# Patient Record
Sex: MALE | Race: BLACK OR AFRICAN AMERICAN | NOT HISPANIC OR LATINO | Employment: FULL TIME | URBAN - METROPOLITAN AREA
[De-identification: names, ages, dates, MRNs, and addresses within clinical notes are randomized per-mention and may not be internally consistent; named-entity substitution may affect disease eponyms.]

---

## 2019-04-03 ENCOUNTER — APPOINTMENT (OUTPATIENT)
Dept: GENERAL RADIOLOGY | Facility: HOSPITAL | Age: 54
End: 2019-04-03

## 2019-04-03 ENCOUNTER — HOSPITAL ENCOUNTER (EMERGENCY)
Facility: HOSPITAL | Age: 54
Discharge: HOME | End: 2019-04-04
Attending: EMERGENCY MEDICINE | Admitting: EMERGENCY MEDICINE

## 2019-04-03 DIAGNOSIS — R07.9 NONSPECIFIC CHEST PAIN: ICD-10-CM

## 2019-04-03 DIAGNOSIS — F14.10 COCAINE ABUSE (HCC): ICD-10-CM

## 2019-04-03 DIAGNOSIS — F10.920 ALCOHOLIC INTOXICATION WITHOUT COMPLICATION (HCC): Primary | ICD-10-CM

## 2019-04-03 LAB
ALBUMIN SERPL-MCNC: 4.51 G/DL (ref 3.2–4.8)
ALBUMIN/GLOB SERPL: 1.5 G/DL (ref 1.5–2.5)
ALP SERPL-CCNC: 81 U/L (ref 25–100)
ALT SERPL W P-5'-P-CCNC: 46 U/L (ref 7–40)
ANION GAP SERPL CALCULATED.3IONS-SCNC: 14 MMOL/L (ref 3–11)
AST SERPL-CCNC: 38 U/L (ref 0–33)
BASOPHILS # BLD AUTO: 0.03 10*3/MM3 (ref 0–0.2)
BASOPHILS NFR BLD AUTO: 0.4 % (ref 0–1)
BILIRUB SERPL-MCNC: 0.2 MG/DL (ref 0.3–1.2)
BNP SERPL-MCNC: 6 PG/ML (ref 0–100)
BUN BLD-MCNC: 14 MG/DL (ref 9–23)
BUN/CREAT SERPL: 15.6 (ref 7–25)
CALCIUM SPEC-SCNC: 8.9 MG/DL (ref 8.7–10.4)
CHLORIDE SERPL-SCNC: 109 MMOL/L (ref 99–109)
CO2 SERPL-SCNC: 22 MMOL/L (ref 20–31)
CREAT BLD-MCNC: 0.9 MG/DL (ref 0.6–1.3)
DEPRECATED RDW RBC AUTO: 48 FL (ref 37–54)
EOSINOPHIL # BLD AUTO: 0.07 10*3/MM3 (ref 0–0.3)
EOSINOPHIL NFR BLD AUTO: 0.8 % (ref 0–3)
ERYTHROCYTE [DISTWIDTH] IN BLOOD BY AUTOMATED COUNT: 13.5 % (ref 11.3–14.5)
ETHANOL BLD-MCNC: 356 MG/DL (ref 0–10)
GFR SERPL CREATININE-BSD FRML MDRD: 106 ML/MIN/1.73
GFR SERPL CREATININE-BSD FRML MDRD: 88 ML/MIN/1.73
GLOBULIN UR ELPH-MCNC: 3.1 GM/DL
GLUCOSE BLD-MCNC: 95 MG/DL (ref 70–100)
HCT VFR BLD AUTO: 42.2 % (ref 38.9–50.9)
HGB BLD-MCNC: 14.6 G/DL (ref 13.1–17.5)
HOLD SPECIMEN: NORMAL
HOLD SPECIMEN: NORMAL
IMM GRANULOCYTES # BLD AUTO: 0.06 10*3/MM3 (ref 0–0.05)
IMM GRANULOCYTES NFR BLD AUTO: 0.7 % (ref 0–0.6)
LIPASE SERPL-CCNC: 121 U/L (ref 6–51)
LYMPHOCYTES # BLD AUTO: 2.3 10*3/MM3 (ref 0.6–4.8)
LYMPHOCYTES NFR BLD AUTO: 27.8 % (ref 24–44)
MCH RBC QN AUTO: 33.2 PG (ref 27–31)
MCHC RBC AUTO-ENTMCNC: 34.6 G/DL (ref 32–36)
MCV RBC AUTO: 95.9 FL (ref 80–99)
MONOCYTES # BLD AUTO: 0.79 10*3/MM3 (ref 0–1)
MONOCYTES NFR BLD AUTO: 9.6 % (ref 0–12)
NEUTROPHILS # BLD AUTO: 5.01 10*3/MM3 (ref 1.5–8.3)
NEUTROPHILS NFR BLD AUTO: 60.7 % (ref 41–71)
PLATELET # BLD AUTO: 296 10*3/MM3 (ref 150–450)
PMV BLD AUTO: 9.6 FL (ref 6–12)
POTASSIUM BLD-SCNC: 3.7 MMOL/L (ref 3.5–5.5)
PROT SERPL-MCNC: 7.6 G/DL (ref 5.7–8.2)
RBC # BLD AUTO: 4.4 10*6/MM3 (ref 4.2–5.76)
SODIUM BLD-SCNC: 145 MMOL/L (ref 132–146)
TROPONIN I SERPL-MCNC: 0.01 NG/ML (ref 0–0.07)
WBC NRBC COR # BLD: 8.26 10*3/MM3 (ref 3.5–10.8)
WHOLE BLOOD HOLD SPECIMEN: NORMAL
WHOLE BLOOD HOLD SPECIMEN: NORMAL

## 2019-04-03 PROCEDURE — 93005 ELECTROCARDIOGRAM TRACING: CPT

## 2019-04-03 PROCEDURE — 80307 DRUG TEST PRSMV CHEM ANLYZR: CPT | Performed by: EMERGENCY MEDICINE

## 2019-04-03 PROCEDURE — 80053 COMPREHEN METABOLIC PANEL: CPT | Performed by: EMERGENCY MEDICINE

## 2019-04-03 PROCEDURE — 83880 ASSAY OF NATRIURETIC PEPTIDE: CPT

## 2019-04-03 PROCEDURE — 83690 ASSAY OF LIPASE: CPT | Performed by: EMERGENCY MEDICINE

## 2019-04-03 PROCEDURE — 85025 COMPLETE CBC W/AUTO DIFF WBC: CPT

## 2019-04-03 PROCEDURE — 71045 X-RAY EXAM CHEST 1 VIEW: CPT

## 2019-04-03 PROCEDURE — 99285 EMERGENCY DEPT VISIT HI MDM: CPT

## 2019-04-03 PROCEDURE — 93005 ELECTROCARDIOGRAM TRACING: CPT | Performed by: EMERGENCY MEDICINE

## 2019-04-03 PROCEDURE — 84484 ASSAY OF TROPONIN QUANT: CPT

## 2019-04-03 RX ORDER — LISINOPRIL 20 MG/1
20 TABLET ORAL DAILY
COMMUNITY
End: 2019-04-04

## 2019-04-03 RX ORDER — SODIUM CHLORIDE 0.9 % (FLUSH) 0.9 %
10 SYRINGE (ML) INJECTION AS NEEDED
Status: DISCONTINUED | OUTPATIENT
Start: 2019-04-03 | End: 2019-04-04 | Stop reason: HOSPADM

## 2019-04-03 RX ORDER — ASPIRIN 81 MG/1
324 TABLET, CHEWABLE ORAL ONCE
Status: DISCONTINUED | OUTPATIENT
Start: 2019-04-03 | End: 2019-04-04 | Stop reason: HOSPADM

## 2019-04-04 VITALS
BODY MASS INDEX: 24.34 KG/M2 | HEIGHT: 70 IN | WEIGHT: 170 LBS | SYSTOLIC BLOOD PRESSURE: 129 MMHG | HEART RATE: 75 BPM | DIASTOLIC BLOOD PRESSURE: 72 MMHG | RESPIRATION RATE: 18 BRPM | OXYGEN SATURATION: 97 % | TEMPERATURE: 98.4 F

## 2019-04-04 NOTE — ED PROVIDER NOTES
"Mojgan Mercedes is a 53 y.o. male who presents to the ED with complaints of chest pain. The patient reports that for the past 4 days he has been experiencing chest pain that comes and goes for 5 minutes at a time. He states that he smoked crack today and his chest began hurting shortly after that today, but the pain has since lessened. He also reports feeling lightheaded when he is experiencing the chest pain. He denies shortness of breath, cough, rhinorrhea, and nausea. The patient has a history of hypertension and states that he has had a myocardial infarction once before. He also reports alcohol use. There are no other acute complaints at this time.         History provided by:  Patient  Chest Pain   Pain radiates to:  Does not radiate  Pain severity:  Moderate  Duration:  4 days  Timing:  Intermittent  Associated symptoms: no cough, no nausea and no shortness of breath        Review of Systems   HENT: Negative for rhinorrhea.    Respiratory: Negative for cough and shortness of breath.    Cardiovascular: Positive for chest pain.   Gastrointestinal: Negative for nausea.   Neurological: Positive for light-headedness.   All other systems reviewed and are negative.      Past Medical History:   Diagnosis Date   • Hypertension    • Myocardial infarction (CMS/HCC)        No Known Allergies    Past Surgical History:   Procedure Laterality Date   • ABDOMINAL SURGERY      Knife Wound and abd repair   • TOTAL ELBOW REPLACEMENT Right        History reviewed. No pertinent family history.    Social History     Socioeconomic History   • Marital status: Single     Spouse name: Not on file   • Number of children: Not on file   • Years of education: Not on file   • Highest education level: Not on file   Tobacco Use   • Smoking status: Current Every Day Smoker     Packs/day: 0.50     Years: 40.00     Pack years: 20.00   Substance and Sexual Activity   • Alcohol use: Yes     Comment: \"as much as I can\"   • Drug use: Yes "     Comment: Crack   • Sexual activity: Defer         Objective   Physical Exam   Constitutional: He is oriented to person, place, and time. He appears well-developed and well-nourished. No distress.   HENT:   Head: Normocephalic and atraumatic.   Pharynx benign. Airway patent.    Eyes: Conjunctivae are normal. No scleral icterus.   Neck: Normal range of motion. Neck supple.   Cardiovascular: Normal rate, regular rhythm and normal heart sounds.   Pulmonary/Chest: Effort normal and breath sounds normal. He exhibits tenderness.   Sternal and left upper chest tenderness.    Abdominal: Soft. There is no tenderness.   Musculoskeletal: Normal range of motion.   Neurological: He is alert and oriented to person, place, and time.   Skin: Skin is warm and dry.   Psychiatric: He has a normal mood and affect. His behavior is normal.   Nursing note and vitals reviewed.      Procedures         ED Course  ED Course as of Apr 05 0152 Wed Apr 03, 2019 2115 He is quite intoxicated and has no one to pick him up or place to go safely.  Will watch until more sober.  [LI]   Thu Apr 04, 2019   0052 Currently sleeping.  No evidence for heart attack by ECG and troponin.  Doubt IHD.  Will need to stay long enough to sober up and be discharged.  [LI]   0054 Despite history of hypertension and not taking his lisinopril for months, his BP has been low normal here.  [LI]   0548 The patient has been deemed clinically sober and is now appropriate for discharge.  [DD]      ED Course User Index  [DD] Michael Solomon MD  [LI] Armando Isidro MD       Recent Results (from the past 24 hour(s))   Comprehensive Metabolic Panel    Collection Time: 04/03/19  6:30 PM   Result Value Ref Range    Glucose 95 70 - 100 mg/dL    BUN 14 9 - 23 mg/dL    Creatinine 0.90 0.60 - 1.30 mg/dL    Sodium 145 132 - 146 mmol/L    Potassium 3.7 3.5 - 5.5 mmol/L    Chloride 109 99 - 109 mmol/L    CO2 22.0 20.0 - 31.0 mmol/L    Calcium 8.9 8.7 - 10.4 mg/dL    Total  Protein 7.6 5.7 - 8.2 g/dL    Albumin 4.51 3.20 - 4.80 g/dL    ALT (SGPT) 46 (H) 7 - 40 U/L    AST (SGOT) 38 (H) 0 - 33 U/L    Alkaline Phosphatase 81 25 - 100 U/L    Total Bilirubin 0.2 (L) 0.3 - 1.2 mg/dL    eGFR Non African Amer 88 >60 mL/min/1.73    eGFR  African Amer 106 >60 mL/min/1.73    Globulin 3.1 gm/dL    A/G Ratio 1.5 1.5 - 2.5 g/dL    BUN/Creatinine Ratio 15.6 7.0 - 25.0    Anion Gap 14.0 (H) 3.0 - 11.0 mmol/L   Lipase    Collection Time: 04/03/19  6:30 PM   Result Value Ref Range    Lipase 121 (H) 6 - 51 U/L   BNP    Collection Time: 04/03/19  6:30 PM   Result Value Ref Range    BNP 6.0 0.0 - 100.0 pg/mL   Light Blue Top    Collection Time: 04/03/19  6:30 PM   Result Value Ref Range    Extra Tube hold for add-on    Green Top (Gel)    Collection Time: 04/03/19  6:30 PM   Result Value Ref Range    Extra Tube Hold for add-ons.    Lavender Top    Collection Time: 04/03/19  6:30 PM   Result Value Ref Range    Extra Tube hold for add-on    Gold Top - SST    Collection Time: 04/03/19  6:30 PM   Result Value Ref Range    Extra Tube Hold for add-ons.    CBC Auto Differential    Collection Time: 04/03/19  6:30 PM   Result Value Ref Range    WBC 8.26 3.50 - 10.80 10*3/mm3    RBC 4.40 4.20 - 5.76 10*6/mm3    Hemoglobin 14.6 13.1 - 17.5 g/dL    Hematocrit 42.2 38.9 - 50.9 %    MCV 95.9 80.0 - 99.0 fL    MCH 33.2 (H) 27.0 - 31.0 pg    MCHC 34.6 32.0 - 36.0 g/dL    RDW 13.5 11.3 - 14.5 %    RDW-SD 48.0 37.0 - 54.0 fl    MPV 9.6 6.0 - 12.0 fL    Platelets 296 150 - 450 10*3/mm3    Neutrophil % 60.7 41.0 - 71.0 %    Lymphocyte % 27.8 24.0 - 44.0 %    Monocyte % 9.6 0.0 - 12.0 %    Eosinophil % 0.8 0.0 - 3.0 %    Basophil % 0.4 0.0 - 1.0 %    Immature Grans % 0.7 (H) 0.0 - 0.6 %    Neutrophils, Absolute 5.01 1.50 - 8.30 10*3/mm3    Lymphocytes, Absolute 2.30 0.60 - 4.80 10*3/mm3    Monocytes, Absolute 0.79 0.00 - 1.00 10*3/mm3    Eosinophils, Absolute 0.07 0.00 - 0.30 10*3/mm3    Basophils, Absolute 0.03 0.00 - 0.20  10*3/mm3    Immature Grans, Absolute 0.06 (H) 0.00 - 0.05 10*3/mm3   Ethanol    Collection Time: 04/03/19  6:30 PM   Result Value Ref Range    Ethanol 356 (C) 0 - 10 mg/dL   POC Troponin, Rapid    Collection Time: 04/03/19  6:41 PM   Result Value Ref Range    Troponin I 0.01 0.00 - 0.07 ng/mL     Note: In addition to lab results from this visit, the labs listed above may include labs taken at another facility or during a different encounter within the last 24 hours. Please correlate lab times with ED admission and discharge times for further clarification of the services performed during this visit.    XR Chest 1 View   Final Result   No acute cardiopulmonary findings.      Signer Name: Al Verdin MD    Signed: 4/3/2019 7:37 PM    Workstation Name: [id number]            Vitals:    04/03/19 1933 04/03/19 1945 04/03/19 2000 04/03/19 2302   BP:  116/80 117/88 111/76   Pulse: 81 78 75    Resp:       Temp:       SpO2: 97% 95% 97% 96%   Weight:       Height:         Medications   sodium chloride 0.9 % flush 10 mL (not administered)   aspirin chewable tablet 324 mg (324 mg Oral Not Given 4/3/19 1830)     ECG/EMG Results (last 24 hours)     Procedure Component Value Units Date/Time    ECG 12 Lead [[id number]] Collected:  04/03/19 1826     Updated:  04/03/19 1915    Narrative:       Test Reason : CP  Blood Pressure : **/** mmHG  Vent. Rate : 093 BPM     Atrial Rate : 093 BPM     P-R Int : 158 ms          QRS Dur : 096 ms      QT Int : 350 ms       P-R-T Axes : 090 062 059 degrees     QTc Int : 435 ms    ** Poor data quality, interpretation may be adversely affected  Normal sinus rhythm  Normal ECG  No previous ECGs available  Confirmed by KEL SHEEHAN MD ([id number]) on 4/3/2019 7:15:33 PM    Referred By:  IFEOMA NICOLAS           Confirmed By:KEL SHEEHAN MD        ECG 12 Lead   Final Result   Test Reason : CP   Blood Pressure : **/** mmHG   Vent. Rate : 093 BPM     Atrial Rate : 093 BPM      P-R Int : 158 ms          QRS Dur :  096 ms       QT Int : 350 ms       P-R-T Axes : 090 062 059 degrees      QTc Int : 435 ms      ** Poor data quality, interpretation may be adversely affected   Normal sinus rhythm   Normal ECG   No previous ECGs available   Confirmed by KEL ISIDRO MD ([id number]) on 4/3/2019 7:15:33 PM      Referred By:  ED MD           Confirmed By:KEL ISIDRO MD                        [facility]    Final diagnoses:   Alcoholic intoxication without complication (CMS/HCC)   Cocaine abuse (CMS/HCC)   Nonspecific chest pain       Documentation assistance provided by hadley Sutton.  Information recorded by the scribe was done at my direction and has been verified and validated by me.     Donna Sutton  04/03/19 2030       Donna Sutton  04/03/19 2120       Kel Isidro MD  04/04/19 0057       Kel Isidro MD  04/05/19 0152

## 2019-04-04 NOTE — DISCHARGE INSTRUCTIONS
You need healthy life habits, including stopping abusing alcohol and other drugs.  If you have a doctor, make an appointment to see her or him for help getting a good life style.  If you don't have a doctor, make an appointment either with a [facility] clinic or another practice listed below.    Follow up with one of the [facility] Primary Care Providers below to setup primary care. If you need assistance coordinating a primary care appointment with a [facility] Primary Care Provider, please contact the Primary Care Coordinators at [phone] for appointment scheduling.    [facility], Primary Care   [address], [address]   [place], [place] [id number] [phone]    [facility] Internal Medicine & Endocrinology  [address], [address]  [place] [address] [phone]    [facility] Family Medicine  [address], [address]   [place] [address] [phone]    [facility] Primary Care  [address], [address]  [place], [place] [id number] [phone]    [facility], Primary Care,   [address], [address]   [place] [address] [phone]    [facility] Primary [facility]  [address], [address]  [place], [place] [id number] [phone]    [facility], Primary Care  [address], [address]  [place], [place] [address] [phone]    [facility] Internal Medicine & Pediatrics  [address], [address]   [place] [address] [phone]    [facility], Primary Care  [address] [place] [address] [phone]      [facility] Primary [facility]  [address], [address]   [place] [address] [phone]    [facility]  [address] [place], [place] [address] [phone]    Follow up with  one of the physician centers below to setup primary care.    [facility], [phone], [address], [address], [place] [address]    [facility], [phone], [address], [address] [place] [address], [phone], [address] [place] [address] [address] [place] [address];     [facility], [phone], [address] [place] [address]